# Patient Record
Sex: MALE | Race: WHITE | Employment: FULL TIME
[De-identification: names, ages, dates, MRNs, and addresses within clinical notes are randomized per-mention and may not be internally consistent; named-entity substitution may affect disease eponyms.]

---

## 2017-06-09 PROBLEM — I86.1 SCROTAL VARICES: Status: ACTIVE | Noted: 2017-06-09

## 2017-06-09 PROBLEM — N20.1 CALCULUS OF URETER: Status: ACTIVE | Noted: 2017-06-09

## 2017-06-09 PROBLEM — N99.61: Status: ACTIVE | Noted: 2017-06-09

## 2017-06-09 PROBLEM — E29.1 TESTICULAR HYPOFUNCTION: Status: ACTIVE | Noted: 2017-06-09

## 2017-06-09 PROBLEM — N45.3 EPIDIDYMO-ORCHITIS: Status: ACTIVE | Noted: 2017-06-09

## 2017-06-09 PROBLEM — N40.1 ENLARGED PROSTATE WITH LOWER URINARY TRACT SYMPTOMS (LUTS): Status: ACTIVE | Noted: 2017-06-09

## 2017-06-09 PROBLEM — N43.3 HYDROCELE, UNSPECIFIED: Status: ACTIVE | Noted: 2017-06-09

## 2017-07-17 PROBLEM — R79.89 LOW TESTOSTERONE: Status: ACTIVE | Noted: 2017-07-17

## 2017-11-15 PROBLEM — H01.00A BLEPHARITIS OF UPPER AND LOWER EYELIDS OF BOTH EYES: Status: ACTIVE | Noted: 2017-11-15

## 2017-11-15 PROBLEM — H04.129 DRY EYE: Status: ACTIVE | Noted: 2017-11-15

## 2017-11-15 PROBLEM — H01.00B BLEPHARITIS OF UPPER AND LOWER EYELIDS OF BOTH EYES: Status: ACTIVE | Noted: 2017-11-15

## 2017-11-15 PROBLEM — H02.889 MEIBOMIAN GLAND DYSFUNCTION: Status: ACTIVE | Noted: 2017-11-15

## 2017-11-15 PROBLEM — H02.59 FLOPPY EYELID SYNDROME: Status: ACTIVE | Noted: 2017-11-15

## 2017-11-15 PROBLEM — H53.2 MONOCULAR DIPLOPIA OF BOTH EYES: Status: ACTIVE | Noted: 2017-11-15

## 2020-02-22 ENCOUNTER — NURSE TRIAGE (OUTPATIENT)
Dept: OTHER | Facility: CLINIC | Age: 50
End: 2020-02-22

## 2020-02-23 NOTE — TELEPHONE ENCOUNTER
Reason for Disposition   Watery or blood-tinged fluid dripping from the NOSE or EARS now  (Exception: tears from crying or nosebleed from nasal trauma)    Answer Assessment - Initial Assessment Questions  1. MECHANISM: \"How did the injury happen? \" For falls, ask: \"What height did you fall from? \" and \"What surface did you fall against?\"      Pt reports hitting his R head/eye/temple in hairline on the garage door. Pt did not fall. 2. ONSET: \"When did the injury happen? \" (Minutes or hours ago)       Pt reports about 45 minutes ago. 3. NEUROLOGIC SYMPTOMS: \"Was there any loss of consciousness? \" \"Are there any other neurological symptoms? \"       No loss of consciousness, pt reports being dazed. No other neurological symptoms. 4. MENTAL STATUS: \"Does the person know who he is, who you are, and where he is? \"       Yes  5. LOCATION: \"What part of the head was hit? \"       R side of head/eye/temple in hairline  6. SCALP APPEARANCE: \"What does the scalp look like? Is it bleeding now? \" If so, ask: \"Is it difficult to stop? \"       No bleeding or scrape, pt can feel a small raised area, area feels \"numb\" to touch. 7. SIZE: For cuts, bruises, or swelling, ask: \"How large is it? \" (e.g., inches or centimeters)       NA  8. PAIN: \"Is there any pain? \" If so, ask: \"How bad is it? \"  (e.g., Scale 1-10; or mild, moderate, severe)      8/10 immediately and not letting up  9. TETANUS: For any breaks in the skin, ask: \"When was the last tetanus booster? \"      NA  10. OTHER SYMPTOMS: \"Do you have any other symptoms? \" (e.g., neck pain, vomiting)        Denies  11. PREGNANCY: \"Is there any chance you are pregnant? \" \"When was your last menstrual period? \"        No    Protocols used: HEAD INJURY-ADULT-AH    Pt reports hitting his head on the partially closed garage door approximately 45 minutes ago. Pt reports headache 8/10 and small amount of blood in his right nostril. Pt denies confusion, nausea, or vomiting.   Pt reports \"I saw stars when I hit my head, but I never passed out\". Pt is advised to go to the ED at this time for evaluation and verbalizes understanding of nurses instructions.   Pt reports he has someone with him that is going to drive him to the hospital.

## 2024-06-01 ENCOUNTER — ON CAMPUS - OUTPATIENT (OUTPATIENT)
Dept: URBAN - METROPOLITAN AREA HOSPITAL 51 | Facility: HOSPITAL | Age: 54
End: 2024-06-01

## 2024-06-01 DIAGNOSIS — K62.5 HEMORRHAGE OF ANUS AND RECTUM: ICD-10-CM

## 2024-06-01 DIAGNOSIS — R13.10 DYSPHAGIA, UNSPECIFIED: ICD-10-CM

## 2024-06-01 DIAGNOSIS — K76.0 FATTY (CHANGE OF) LIVER, NOT ELSEWHERE CLASSIFIED: ICD-10-CM

## 2024-06-01 DIAGNOSIS — R10.13 EPIGASTRIC PAIN: ICD-10-CM

## 2024-06-01 DIAGNOSIS — K64.8 OTHER HEMORRHOIDS: ICD-10-CM

## 2024-06-01 DIAGNOSIS — K64.5 PERIANAL VENOUS THROMBOSIS: ICD-10-CM

## 2024-06-01 PROCEDURE — 99214 OFFICE O/P EST MOD 30 MIN: CPT | Performed by: INTERNAL MEDICINE
